# Patient Record
Sex: MALE | Race: AMERICAN INDIAN OR ALASKA NATIVE
[De-identification: names, ages, dates, MRNs, and addresses within clinical notes are randomized per-mention and may not be internally consistent; named-entity substitution may affect disease eponyms.]

---
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
## 2019-08-06 NOTE — EVENT NOTE
ED Screening Note


Date of service: 08/06/19


Time: 14:27


ED Screening Note: 


38 y/o male come in for 3 days history of tooth pain.  He is aware that he has a

bad tooth.  Has been taking pain. PMH no Meds none. No dentist.  





This initial assessment/diagnostic orders/clinical plan/treatment(s) is/are 

subject to change based on patients health status, clinical progression and re-

assessment by fellow clinical providers in the ED. Further treatment and workup 

at subsequent clinical providers discretion. Patient/guardian urged not to elope

from the ED as their condition may be serious if not clinically assessed and 

managed. 





Initial orders include:

## 2019-08-06 NOTE — EMERGENCY DEPARTMENT REPORT
ED ENT HPI





- General


Chief complaint: Dental/Oral


Stated complaint: RT SIDE TOOTHACHE


Time Seen by Provider: 08/06/19 14:26


Source: patient


Mode of arrival: Ambulatory


Limitations: No Limitations





- History of Present Illness


Initial comments: 





38 y/o male come in for 3 days history of tooth pain.  He is aware that he has a

bad tooth.  Has been taking pain. PMH no Meds none. No dentist.  





MD complaint: tooth pain


Onset/Timing: 3


-: days(s)


Location: lower lip


Quality: aching, sharp


Consistency: constant


Improves with: none


Context- Dental: history of dental caries, poor dental care


Associated Symptoms: toothache





- Related Data


                                  Previous Rx's











 Medication  Instructions  Recorded  Last Taken  Type


 


Ziprasidone [Geodon] 40 mg PO BID #60 capsule 03/29/15 Unknown Rx


 


Amoxicillin [Amoxicillin TAB] 875 mg PO BID #20 tablet 08/06/19 Unknown Rx


 


Ibuprofen [Motrin 600 MG tab] 600 mg PO Q8H PRN #30 tablet 08/06/19 Unknown Rx











                                    Allergies











Allergy/AdvReac Type Severity Reaction Status Date / Time


 


No Known Allergies Allergy   Verified 03/27/15 13:43














ED Dental HPI





- General


Chief complaint: Dental/Oral


Stated complaint: RT SIDE TOOTHACHE


Time Seen by Provider: 08/06/19 14:26


Source: patient


Mode of arrival: Ambulatory


Limitations: No Limitations





- Related Data


                                  Previous Rx's











 Medication  Instructions  Recorded  Last Taken  Type


 


Ziprasidone [Geodon] 40 mg PO BID #60 capsule 03/29/15 Unknown Rx


 


Amoxicillin [Amoxicillin TAB] 875 mg PO BID #20 tablet 08/06/19 Unknown Rx


 


Ibuprofen [Motrin 600 MG tab] 600 mg PO Q8H PRN #30 tablet 08/06/19 Unknown Rx











                                    Allergies











Allergy/AdvReac Type Severity Reaction Status Date / Time


 


No Known Allergies Allergy   Verified 03/27/15 13:43














ED Review of Systems


ROS: 


Stated complaint: RT SIDE TOOTHACHE


Other details as noted in HPI





Comment: All other systems reviewed and negative





ED Past Medical Hx





- Past Medical History


Previous Medical History?: Yes


Hx Hypertension: Yes


Hx Diabetes: Yes


Hx Psychiatric Treatment: No


Additional medical history: schizophrenia off meds





- Surgical History


Past Surgical History?: Yes


Additional Surgical History: HERNIA REPAIR





- Social History


Smoking Status: Current Every Day Smoker


Substance Use Type: Alcohol, Marijuana





- Medications


Home Medications: 


                                Home Medications











 Medication  Instructions  Recorded  Confirmed  Last Taken  Type


 


Ziprasidone [Geodon] 40 mg PO BID #60 capsule 03/29/15  Unknown Rx


 


Amoxicillin [Amoxicillin TAB] 875 mg PO BID #20 tablet 08/06/19  Unknown Rx


 


Ibuprofen [Motrin 600 MG tab] 600 mg PO Q8H PRN #30 tablet 08/06/19  Unknown Rx














ED Physical Exam





- General


Limitations: No Limitations


General appearance: alert, in no apparent distress





- Head


Head exam: Present: atraumatic, normocephalic





- Eye


Eye exam: Present: normal appearance





- Expanded ENT Exam


  ** Expanded


Teeth exam: Present: dental caries, gingival enlargement





- Respiratory


Respiratory exam: Absent: rhonchi





- Neurological Exam


Neurological exam: Present: alert, oriented X3





- Psychiatric


Psychiatric exam: Present: normal affect, normal mood





- Skin


Skin exam: Present: warm, dry, intact, normal color.  Absent: rash





ED Course





                                   Vital Signs











  08/06/19





  14:26


 


Temperature 97.7 F


 


Pulse Rate 56 L


 


Blood Pressure 133/100


 


O2 Sat by Pulse 98





Oximetry 














ED Medical Decision Making





- Medical Decision Making





38 y/o male come in for 3 days history of tooth pain.  He is aware that he has a

bad tooth.  Has been taking pain. PMH no Meds none. No dentist.  Rx for 

amoxicillin and ibuprofen 





Critical care attestation.: 


If time is entered above; I have spent that time in minutes in the direct care 

of this critically ill patient, excluding procedure time.








ED Disposition


Clinical Impression: 


 Pain, dental





Disposition: DC-01 TO HOME OR SELFCARE


Is pt being admited?: No


Does the pt Need Aspirin: No


Condition: Stable


Instructions:  Toothache (ED)


Additional Instructions: 


take medication as prescribed.  


Prescriptions: 


Amoxicillin [Amoxicillin TAB] 875 mg PO BID #20 tablet


Ibuprofen [Motrin 600 MG tab] 600 mg PO Q8H PRN #30 tablet


 PRN Reason: Pain


Referrals: 


[facility] [Outside] - 3-5 Days


[facility] [Outside] - 3-5 Days

## 2019-12-24 NOTE — XRAY REPORT
CHEST 2 VIEWS 



INDICATION / CLINICAL INFORMATION:

fever flank pain.



COMPARISON: 

None available.



FINDINGS:



SUPPORT DEVICES: None.

HEART / MEDIASTINUM: No significant abnormality. 

LUNGS / PLEURA: No significant pulmonary or pleural abnormality. No pneumothorax. 



ADDITIONAL FINDINGS: No significant additional findings.



IMPRESSION:

1. No acute findings.



Signer Name: Paxton Saini MD 

Signed: 12/24/2019 5:46 PM

 Workstation Name: [id number]

## 2019-12-24 NOTE — EMERGENCY DEPARTMENT REPORT
Blank Doc





- Documentation


Documentation: 





39-year-old male that presents with left flank pain.\





This initial assessment/diagnostic orders/clinical plan/treatment(s) is/are 

subject to change based on patient's health status, clinical progression and re-

assessment by fellow clinical providers in the ED.  Further treatment and workup

at subsequent clinical providers discretion.  Patient/guardians urged not to 

elope from the ED as their condition may be serious if not clinically assessed 

and managed.  Initial orders include:


1- Patient sent to [facility] for further evaluation and treatment


2- labs


3- UA


4- motrin-RN to repeat vitals
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
## 2020-09-29 ENCOUNTER — HOSPITAL ENCOUNTER (EMERGENCY)
Dept: HOSPITAL 5 - ED | Age: 40
Discharge: HOME | End: 2020-09-29
Payer: MEDICAID

## 2020-09-29 VITALS — SYSTOLIC BLOOD PRESSURE: 121 MMHG | DIASTOLIC BLOOD PRESSURE: 60 MMHG

## 2020-09-29 DIAGNOSIS — E11.9: ICD-10-CM
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
## 2020-09-29 DIAGNOSIS — S39.012A: Primary | ICD-10-CM

## 2020-09-29 DIAGNOSIS — F17.200: ICD-10-CM

## 2020-09-29 DIAGNOSIS — Z79.2: ICD-10-CM

## 2020-09-29 DIAGNOSIS — F20.9: ICD-10-CM

## 2020-09-29 DIAGNOSIS — Z79.1: ICD-10-CM

## 2020-09-29 DIAGNOSIS — I10: ICD-10-CM
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
## 2020-09-29 DIAGNOSIS — Z79.899: ICD-10-CM
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
## 2020-09-29 DIAGNOSIS — F14.10: ICD-10-CM

## 2020-09-29 PROCEDURE — 99282 EMERGENCY DEPT VISIT SF MDM: CPT

## 2020-09-29 NOTE — EMERGENCY DEPARTMENT REPORT
ED Back Pain/Injury HPI





- General


Chief Complaint: Back Pain/Injury


Stated Complaint: BACK PAIN


Time Seen by Provider: 09/29/20 21:26


Source: patient


Limitations: No Limitations





- History of Present Illness


Initial Comments: 


 Pt is a 41 y/o aam with hx of low back pain , DMII, and HTN who presents for 

low back strain after lifting furniture 2 days ago. pt denies fall or trauma, pt

states he felt pull in his low back while lifting. pain is described as 4/10 

aching burning. pain radiates to left buttocks, pt denies numbess, no weakness ,

no paralysis. pain is exacerbated by bending , twisting and bending. pain is 

relieved by rest. pt denies dysuria, frequency, or urgency. There is no 

hematuria, remains ambulatory to baseline with steady gait per patient. pt 

denies loss or decrease in bowel or bladder function. 





MD Complaint: back injury


Similar Symptoms Previously: Yes


Place: home





- Related Data


                                  Previous Rx's











 Medication  Instructions  Recorded  Last Taken  Type


 


Ziprasidone [Geodon] 40 mg PO BID #60 capsule 03/29/15 Unknown Rx


 


Amoxicillin [Amoxicillin TAB] 875 mg PO BID #20 tablet 08/06/19 Unknown Rx


 


Ibuprofen [Motrin 600 MG tab] 600 mg PO Q8H PRN #30 tablet 08/06/19 Unknown Rx


 


Ciprofloxacin HCl [Ciprofloxacin 500 mg PO Q12HR #20 tab 12/24/19 Unknown Rx





TAB]    


 


HYDROcodone/APAP 5-325 [Leckrone 1 - 2 each PO Q6HR PRN #10 tablet 12/24/19 Unknown

 Rx





5/325]    


 


Ibuprofen [Motrin 800 MG tab] 800 mg PO Q8HR PRN #20 tablet 12/24/19 Unknown Rx


 


Cyclobenzaprine [Flexeril] 10 mg PO BID PRN #20 tablet 09/29/20 Unknown Rx


 


Menthol/Camphor [Tiger Balm 1 applicatio TP TID PRN #1 tube 09/29/20 Unknown Rx





Ointment]    


 


Naproxen 500 mg PO BID PRN #30 tablet 09/29/20 Unknown Rx











                                    Allergies











Allergy/AdvReac Type Severity Reaction Status Date / Time


 


No Known Allergies Allergy   Verified 03/27/15 13:43














ED Review of Systems


ROS: 


Stated complaint: BACK PAIN


Other details as noted in HPI





Constitutional: denies: chills, fever


Eyes: denies: eye pain, eye discharge, vision change


ENT: denies: ear pain, throat pain


Respiratory: denies: cough, shortness of breath, wheezing


Cardiovascular: denies: chest pain, palpitations


Endocrine: no symptoms reported


Gastrointestinal: denies: abdominal pain, nausea, diarrhea


Genitourinary: denies: urgency, dysuria


Musculoskeletal: back pain, arthralgia


Skin: denies: rash, lesions


Neurological: denies: headache, weakness, paresthesias, vertigo


Psychiatric: denies: anxiety, depression


Hematological/Lymphatic: denies: easy bleeding, easy bruising





ED Past Medical Hx





- Past Medical History


Hx Hypertension: Yes


Hx Diabetes: Yes


Hx Psychiatric Treatment: No


Additional medical history: schizophrenia off meds





- Surgical History


Additional Surgical History: HERNIA REPAIR





- Social History


Smoking Status: Current Every Day Smoker


Substance Use Type: Cocaine





- Medications


Home Medications: 


                                Home Medications











 Medication  Instructions  Recorded  Confirmed  Last Taken  Type


 


Ziprasidone [Geodon] 40 mg PO BID #60 capsule 03/29/15  Unknown Rx


 


Amoxicillin [Amoxicillin TAB] 875 mg PO BID #20 tablet 08/06/19  Unknown Rx


 


Ibuprofen [Motrin 600 MG tab] 600 mg PO Q8H PRN #30 tablet 08/06/19  Unknown Rx


 


Ciprofloxacin HCl [Ciprofloxacin 500 mg PO Q12HR #20 tab 12/24/19  Unknown Rx





TAB]     


 


HYDROcodone/APAP 5-325 [Leckrone 1 - 2 each PO Q6HR PRN #10 tablet 12/24/19  

Unknown Rx





5/325]     


 


Ibuprofen [Motrin 800 MG tab] 800 mg PO Q8HR PRN #20 tablet 12/24/19  Unknown Rx


 


Cyclobenzaprine [Flexeril] 10 mg PO BID PRN #20 tablet 09/29/20  Unknown Rx


 


Menthol/Camphor [Tiger Balm 1 applicatio TP TID PRN #1 tube 09/29/20  Unknown Rx





Ointment]     


 


Naproxen 500 mg PO BID PRN #30 tablet 09/29/20  Unknown Rx














ED Physical Exam





- General


Limitations: No Limitations


General appearance: alert, in no apparent distress





- Head


Head exam: Present: atraumatic, normocephalic





- Eye


Eye exam: Present: normal appearance





- ENT


ENT exam: Present: mucous membranes moist





- Neck


Neck exam: Present: normal inspection, full ROM.  Absent: tenderness





- Respiratory


Respiratory exam: Present: normal lung sounds bilaterally.  Absent: respiratory 

distress, wheezes, stridor





- Cardiovascular


Cardiovascular Exam: Present: regular rate, normal rhythm, normal heart sounds. 

Absent: systolic murmur, diastolic murmur, rubs, gallop





- GI/Abdominal


GI/Abdominal exam: Present: soft, normal bowel sounds.  Absent: distended, 

tenderness





- Rectal


Rectal exam: Present: deferred





- Extremities Exam


Extremities exam: Present: normal inspection, full ROM.  Absent: tenderness





- Back Exam


Back exam: Present: normal inspection, muscle spasm, vertebral tenderness.  

Absent: CVA tenderness (R), CVA tenderness (L)





- Expanded Back Exam


  ** Expanded


Back exam: Absent: saddle anesthesia


Back exam: Positive Straight Leg Raise: Left, Negative Straight Leg Raising: 

Right





- Neurological Exam


Neurological exam: Present: alert, oriented X3, CN II-XII intact, normal gait, 

reflexes normal.  Absent: motor sensory deficit





- Expanded Neurological Exam


  ** Expanded


Patient oriented to: Present: person, place, time


Speech: Present: fluid speech


Motor strength exam: RLE: 5, LLE: 5


DTR: ankle (R): 2+, ankle (L): 2+


Best Eye Response (Shavon): (4) open spontaneously


Best Motor Response (Shavon): (6) obeys commands


Best Verbal Response (Shelton): (5) oriented


[place] Total: 15





- Psychiatric


Psychiatric exam: Present: normal affect, normal mood





- Skin


Skin exam: Present: warm, dry, intact, normal color.  Absent: rash





ED Course





                                   Vital Signs











  09/29/20





  18:51


 


Temperature 98.1 F


 


Pulse Rate 72


 


Respiratory 20





Rate 


 


Blood Pressure 121/60





[Right] 


 


O2 Sat by Pulse 98





Oximetry 














ED Medical Decision Making





- Medical Decision Making


 pain is improved with nsaids given in ed. pt is in usual location, and 

intensity, as previous low back strains.  pt denies urinary symptoms, there has 

been no trauma or fall, low back muscular pain is reproducible to deep palpation

 & Straight leg left. There are no neuro symptomns, This is likely low back 

strain, plan, dc to home , back exercises, moist heat therapy, nsaids prn, 

follow up with primary care doctor in 2-3 days. 





Critical care attestation.: 


If time is entered above; I have spent that time in minutes in the direct care 

of this critically ill patient, excluding procedure time.








ED Disposition


Clinical Impression: 


Low back strain


Qualifiers:


 Encounter type: initial encounter Qualified Code(s): S39.012A - Strain of 

muscle, fascia and tendon of lower back, initial encounter





Disposition: DC-01 TO HOME OR SELFCARE


Is pt being admited?: No


Does the pt Need Aspirin: No


Condition: Stable


Instructions:  Low Back Strain (ED), Core Strengthening Exercises (GEN)


Prescriptions: 


Cyclobenzaprine [Flexeril] 10 mg PO BID PRN #20 tablet


 PRN Reason: Muscle Spasm


Naproxen 500 mg PO BID PRN #30 tablet


 PRN Reason: Pain


Menthol/Camphor [Tiger Balm Ointment] 1 applicatio TP TID PRN #1 tube


 PRN Reason: PAIN


Referrals: 


SIDNEY FAJARDO MD [Staff Physician] - 3-5 Days


Forms:  Work/School Release Form(ED)


Time of Disposition: 22:01

## 2020-10-22 ENCOUNTER — HOSPITAL ENCOUNTER (EMERGENCY)
Dept: HOSPITAL 5 - ED | Age: 40
Discharge: LEFT BEFORE DISCHARGE | End: 2020-10-22
Payer: MEDICAID

## 2020-10-22 VITALS — DIASTOLIC BLOOD PRESSURE: 66 MMHG | SYSTOLIC BLOOD PRESSURE: 118 MMHG

## 2020-10-22 DIAGNOSIS — E11.65: Primary | ICD-10-CM

## 2020-10-22 DIAGNOSIS — Z53.21: ICD-10-CM

## 2020-10-22 DIAGNOSIS — Z00.00: ICD-10-CM

## 2020-10-22 PROCEDURE — 82962 GLUCOSE BLOOD TEST: CPT

## 2020-10-22 NOTE — EMERGENCY DEPARTMENT REPORT
Chief Complaint: Hyperglycemia


Stated Complaint: HYPERGLYCEMIA


Time Seen by Provider: 10/22/20 16:33





- HPI


History of Present Illness: 





Patient is a 40-year-old male presents emergency room with complaints of a blood

sugar check.  He states that he was previously diagnosed with diabetes and used 

to be on medication but has not taken medication since approximately 2015.  He 

denies any symptoms at all.  He denies any nausea, vomiting, diarrhea, fever, 

cough, abdominal pain, chest pain, shortness of breath, urinary symptoms.  He 

denies any other past medical history.  No allergies to medications.  He denies 

any SI or HI.








Vitals are normal





Blood glucose point of care performed by nurse and is 81








On exam:


Non toxic appearing, no acute distress


atraumatic, normocephalic


normal appearance of the eyes, EOMI, no periorbital edema or ecchymosis


moist mucus membranes


No respiratory distress, no accessory muscle use


A&O x4, no focal neuro deficit


skin is warm, dry











Patient is presenting for a blood sugar check


He denies any symptoms


His blood glucose is 81


Advised patient that this is a normal reading and where his blood sugar should 

be


Patient will be referred to primary care doctor 


Discussed strict return precautions





Medical screen examination performed and there is no threat to life or limb at 

this time








- Exam


Vital Signs: 


                                   Vital Signs











  10/22/20





  13:35


 


Temperature 98.6 F


 


Pulse Rate 63


 


Respiratory 18





Rate 


 


Blood Pressure 118/66





[Right] 


 


O2 Sat by Pulse 98





Oximetry 











MSE screening note: 


Focused history and physical exam performed.














ED Disposition for MSE


Clinical Impression: 


 Encounter for medical screening examination





Disposition: Z-07 MED SCREENING EXAM-LEFT


Is pt being admited?: No


Does the pt Need Aspirin: No


Condition: Stable


Additional Instructions: 


Increase your water intake.  Follow-up with a primary care doctor.  Return to 

emergency room for any new or worsening symptoms.


Referrals: 


JOSE LUIS HAGAN MD [Staff Physician] - 2-3 Days


[facility] [Provider Group] - 2-3 Days


[facility] [address], [LAB/CONTRACT] - 2-3 Days


Time of Disposition: 16:35


Print Language: ENGLISH

## 2021-05-03 ENCOUNTER — HOSPITAL ENCOUNTER (EMERGENCY)
Dept: HOSPITAL 5 - ED | Age: 41
Discharge: LEFT BEFORE DISCHARGE | End: 2021-05-03
Payer: MEDICAID

## 2021-05-03 VITALS — DIASTOLIC BLOOD PRESSURE: 74 MMHG | SYSTOLIC BLOOD PRESSURE: 124 MMHG

## 2021-05-03 DIAGNOSIS — R05: Primary | ICD-10-CM

## 2021-05-03 DIAGNOSIS — Z53.21: ICD-10-CM

## 2021-05-23 ENCOUNTER — HOSPITAL ENCOUNTER (EMERGENCY)
Dept: HOSPITAL 5 - ED | Age: 41
LOS: 1 days | End: 2021-05-24
Payer: MEDICAID

## 2021-05-23 DIAGNOSIS — Z53.21: ICD-10-CM

## 2021-05-23 DIAGNOSIS — Z00.8: Primary | ICD-10-CM

## 2021-07-31 ENCOUNTER — HOSPITAL ENCOUNTER (EMERGENCY)
Dept: HOSPITAL 5 - ED | Age: 41
Discharge: LEFT BEFORE DISCHARGE | End: 2021-07-31
Payer: MEDICAID

## 2021-07-31 DIAGNOSIS — M54.9: Primary | ICD-10-CM

## 2021-07-31 DIAGNOSIS — Z53.21: ICD-10-CM

## 2021-09-30 ENCOUNTER — HOSPITAL ENCOUNTER (EMERGENCY)
Dept: HOSPITAL 5 - ED | Age: 41
Discharge: HOME | End: 2021-09-30
Payer: MEDICAID

## 2021-09-30 VITALS — SYSTOLIC BLOOD PRESSURE: 113 MMHG | DIASTOLIC BLOOD PRESSURE: 57 MMHG

## 2021-09-30 DIAGNOSIS — R07.89: Primary | ICD-10-CM

## 2021-09-30 DIAGNOSIS — Z98.890: ICD-10-CM

## 2021-09-30 DIAGNOSIS — E11.8: ICD-10-CM

## 2021-09-30 DIAGNOSIS — I10: ICD-10-CM

## 2021-09-30 DIAGNOSIS — F17.290: ICD-10-CM

## 2021-09-30 LAB
BILIRUB UR QL STRIP: 2
CAOX CRY #/AREA URNS HPF: (no result) /[HPF]
MUCOUS THREADS #/AREA URNS HPF: (no result) /HPF
PH UR STRIP: 5 [PH] (ref 5–7)
RENAL EPI CELLS #/AREA URNS LPF: 0 /LPF
UROBILINOGEN UR-MCNC: 1 MG/DL (ref ?–2)
WBC #/AREA URNS HPF: 5 /HPF (ref 0–6)

## 2021-09-30 PROCEDURE — 93005 ELECTROCARDIOGRAM TRACING: CPT

## 2021-09-30 PROCEDURE — 99284 EMERGENCY DEPT VISIT MOD MDM: CPT

## 2021-09-30 PROCEDURE — 81001 URINALYSIS AUTO W/SCOPE: CPT

## 2021-09-30 PROCEDURE — 71046 X-RAY EXAM CHEST 2 VIEWS: CPT

## 2021-09-30 PROCEDURE — 96372 THER/PROPH/DIAG INJ SC/IM: CPT

## 2021-09-30 NOTE — EMERGENCY DEPARTMENT REPORT
ED GI Bleed HPI





- General


Chief complaint: Abdominal Pain


Stated complaint: RT SIDE BODY PAIN


Time Seen by Provider: 09/30/21 11:48


Source: patient


Mode of arrival: Ambulatory


Limitations: No Limitations





- Related Data


                                  Previous Rx's











 Medication  Instructions  Recorded  Last Taken  Type


 


Ziprasidone [Geodon] 40 mg PO BID #60 capsule 03/29/15 Unknown Rx


 


Amoxicillin [Amoxicillin TAB] 875 mg PO BID #20 tablet 08/06/19 Unknown Rx


 


Ibuprofen [Motrin 600 MG tab] 600 mg PO Q8H PRN #30 tablet 08/06/19 Unknown Rx


 


Ciprofloxacin HCl [Ciprofloxacin 500 mg PO Q12HR #20 tab 12/24/19 Unknown Rx





TAB]    


 


HYDROcodone/APAP 5-325 [[place] 1 - 2 each PO Q6HR PRN #10 tablet 12/24/19 Unknown

 Rx





5/325]    


 


Ibuprofen [Motrin 800 MG tab] 800 mg PO Q8HR PRN #20 tablet 12/24/19 Unknown Rx


 


Cyclobenzaprine [Flexeril] 10 mg PO BID PRN #20 tablet 09/29/20 Unknown Rx


 


Menthol/Camphor [Tiger Balm 1 applicatio TP TID PRN #1 tube 09/29/20 Unknown Rx





Ointment]    


 


Naproxen 500 mg PO BID PRN #30 tablet 09/29/20 Unknown Rx











                                    Allergies











Allergy/AdvReac Type Severity Reaction Status Date / Time


 


No Known Allergies Allergy   Verified 03/27/15 13:43














ED Review of Systems


ROS: 


Stated complaint: RT SIDE BODY PAIN


Other details as noted in HPI








ED Past Medical Hx





- Past Medical History


Hx Hypertension: Yes


Hx Diabetes: Yes


Hx Psychiatric Treatment: No


Additional medical history: schizophrenia off meds





- Surgical History


Additional Surgical History: HERNIA REPAIR





- Social History


Smoking Status: Current Every Day Smoker


Substance Use Type: Alcohol, Cocaine, Marijuana





- Medications


Home Medications: 


                                Home Medications











 Medication  Instructions  Recorded  Confirmed  Last Taken  Type


 


Ziprasidone [Geodon] 40 mg PO BID #60 capsule 03/29/15  Unknown Rx


 


Amoxicillin [Amoxicillin TAB] 875 mg PO BID #20 tablet 08/06/19  Unknown Rx


 


Ibuprofen [Motrin 600 MG tab] 600 mg PO Q8H PRN #30 tablet 08/06/19  Unknown Rx


 


Ciprofloxacin HCl [Ciprofloxacin 500 mg PO Q12HR #20 tab 12/24/19  Unknown Rx





TAB]     


 


HYDROcodone/APAP 5-325 [[place] 1 - 2 each PO Q6HR PRN #10 tablet 12/24/19  

Unknown Rx





5/325]     


 


Ibuprofen [Motrin 800 MG tab] 800 mg PO Q8HR PRN #20 tablet 12/24/19  Unknown Rx


 


Cyclobenzaprine [Flexeril] 10 mg PO BID PRN #20 tablet 09/29/20  Unknown Rx


 


Menthol/Camphor [Tiger Balm 1 applicatio TP TID PRN #1 tube 09/29/20  Unknown Rx





Ointment]     


 


Naproxen 500 mg PO BID PRN #30 tablet 09/29/20  Unknown Rx














ED Physical Exam





- General


Limitations: No Limitations





ED Course





                                   Vital Signs











  09/30/21





  11:42


 


Temperature 98.3 F


 


Pulse Rate 69


 


Respiratory 20





Rate 


 


Blood Pressure 113/57


 


O2 Sat by Pulse 98





Oximetry 











Critical care attestation.: 


If time is entered above; I have spent that time in minutes in the direct care 

of this critically ill patient, excluding procedure time.








ED Disposition


Condition: Stable

## 2021-09-30 NOTE — EMERGENCY DEPARTMENT REPORT
ED General Adult HPI





- General


Chief complaint: Abdominal Pain


Stated complaint: Right-sided lateral thoracic pain


PUI?: No


Time Seen by Provider: 09/30/21 11:48


Source: patient, RN notes reviewed, old records reviewed


Mode of arrival: Ambulatory


Limitations: No Limitations





- History of Present Illness


Initial comments: 





The patient was evaluated in the emergency department for symptoms described in 

the history of present illness.  He/she was evaluated in the context of the 

global COVID-19 pandemic, which necessitated consideration that the patient 

might be at risk for infection with the virus that causes COVID-19.  

Institutional protocols and algorithms that pertain to the evaluation of 

patients at risk for COVID-19 are in a state of rapid change based on 

information released by regulatory bodies including the CDC and federal and 

state organizations.  These policies and algorithms were followed during the 

patient's care in the emergency department.  Please note that these policies, 

procedures and recommendations changed on a rapid basis.








The patient is a 41-year-old gentleman.  He is right-hand dominant.  He presents

to the ER today with complaints of reproducible right-sided lateral thoracic 

muscular pain.  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 

heavy lifting.  The patient denies headache, neck pain, chest pain, abdominal 

pain, shortness of breath, urinary symptoms, DVT, travel, surgery, 

immobilization.





The patient symptoms were improved with Tylenol and with Toradol.





At the moment, the patient is sleeping comfortably on his chair, and is 

endorsing readiness for discharge.


-: Gradual, hour(s)


Location: right (Lateral thoracic)


Radiation: non-radiation


Quality: aching


Consistency: intermittent


Improves with: medication, rest


Worsens with: movement


Associated Symptoms: denies other symptoms





- Related Data


                                  Previous Rx's











 Medication  Instructions  Recorded  Last Taken  Type


 


Ziprasidone [Geodon] 40 mg PO BID #60 capsule 03/29/15 Unknown Rx


 


Amoxicillin [Amoxicillin TAB] 875 mg PO BID #20 tablet 08/06/19 Unknown Rx


 


Ibuprofen [Motrin 600 MG tab] 600 mg PO Q8H PRN #30 tablet 08/06/19 Unknown Rx


 


Ciprofloxacin HCl [Ciprofloxacin 500 mg PO Q12HR #20 tab 12/24/19 Unknown Rx





TAB]    


 


HYDROcodone/APAP 5-325 [[place] 1 - 2 each PO Q6HR PRN #10 tablet 12/24/19 Unknown

 Rx





5/325]    


 


Ibuprofen [Motrin 800 MG tab] 800 mg PO Q8HR PRN #20 tablet 12/24/19 Unknown Rx


 


Cyclobenzaprine [Flexeril] 10 mg PO BID PRN #20 tablet 09/29/20 Unknown Rx


 


Menthol/Camphor [Tiger Balm 1 applicatio TP TID PRN #1 tube 09/29/20 Unknown Rx





Ointment]    


 


Naproxen 500 mg PO BID PRN #30 tablet 09/29/20 Unknown Rx











                                    Allergies











Allergy/AdvReac Type Severity Reaction Status Date / Time


 


No Known Allergies Allergy   Verified 03/27/15 13:43














ED Review of Systems


ROS: 


Stated complaint: RT SIDE BODY PAIN


Other details as noted in HPI





Comment: All other systems reviewed and negative


Musculoskeletal: arthralgia, myalgia





ED Past Medical Hx





- Past Medical History


Hx Hypertension: Yes


Hx Diabetes: Yes


Hx Psychiatric Treatment: No


Additional medical history: schizophrenia off meds





- Surgical History


Additional Surgical History: HERNIA REPAIR





- Social History


Smoking Status: Current Every Day Smoker


Substance Use Type: Alcohol, Cocaine, Marijuana





- Medications


Home Medications: 


                                Home Medications











 Medication  Instructions  Recorded  Confirmed  Last Taken  Type


 


Ziprasidone [Geodon] 40 mg PO BID #60 capsule 03/29/15  Unknown Rx


 


Amoxicillin [Amoxicillin TAB] 875 mg PO BID #20 tablet 08/06/19  Unknown Rx


 


Ibuprofen [Motrin 600 MG tab] 600 mg PO Q8H PRN #30 tablet 08/06/19  Unknown Rx


 


Ciprofloxacin HCl [Ciprofloxacin 500 mg PO Q12HR #20 tab 12/24/19  Unknown Rx





TAB]     


 


HYDROcodone/APAP 5-325 [[place] 1 - 2 each PO Q6HR PRN #10 tablet 12/24/19  

Unknown Rx





5/325]     


 


Ibuprofen [Motrin 800 MG tab] 800 mg PO Q8HR PRN #20 tablet 12/24/19  Unknown Rx


 


Cyclobenzaprine [Flexeril] 10 mg PO BID PRN #20 tablet 09/29/20  Unknown Rx


 


Menthol/Camphor [Tiger Balm 1 applicatio TP TID PRN #1 tube 09/29/20  Unknown Rx





Ointment]     


 


Naproxen 500 mg PO BID PRN #30 tablet 09/29/20  Unknown Rx














ED Physical Exam





- General


Limitations: No Limitations


General appearance: alert, in no apparent distress





- Head


Head exam: Present: atraumatic, normocephalic





- Eye


Eye exam: Present: normal appearance, EOMI.  Absent: nystagmus





- ENT


ENT exam: Present: normal exam, normal orophraynx, mucous membranes moist, 

normal external ear exam





- Neck


Neck exam: Present: normal inspection, full ROM.  Absent: tenderness, 

meningismus





- Respiratory


Respiratory exam: Present: normal lung sounds bilaterally, chest wall 

tenderness.  Absent: respiratory distress, wheezes, rales, rhonchi, stridor, 

decreased breath sounds





- Cardiovascular


Cardiovascular Exam: Present: regular rate, normal rhythm, normal heart sounds. 

Absent: bradycardia, tachycardia, irregular rhythm, systolic murmur, diastolic 

murmur, rubs, gallop





- GI/Abdominal


GI/Abdominal exam: Present: soft.  Absent: distended, tenderness, guarding, 

rebound, rigid, pulsatile mass





- Rectal


Rectal exam: Present: deferred





- Extremities Exam


Extremities exam: Present: normal inspection, full ROM, other (2+ pulses noted 

in the bilateral upper and lower extremities.  There is no palpable cord.   

negative Homans sign.  Muscular compartments are soft.  The pelvis is stable.). 

Absent: pedal edema, calf tenderness





- Back Exam


Back exam: Present: normal inspection, paraspinal tenderness.  Absent: 

tenderness, CVA tenderness (R), CVA tenderness (L), vertebral tenderness





- Neurological Exam


Neurological exam: Present: alert, oriented X3, normal gait, other (No facial 

droop.  Tongue midline.  Extraocular movements intact bilaterally.  Facial 

sensation intact to light touch in V1, V2, V3 distribution bilaterally.  5 and a

5 strength in 4 extremities.  Sensation intact to light touch in 4 

extremities.).  Absent: motor sensory deficit





- Psychiatric


Psychiatric exam: Present: normal affect, normal mood





- Skin


Skin exam: Present: warm, dry, intact, normal color.  Absent: rash





ED Course


                                   Vital Signs











  09/30/21





  11:42


 


Temperature 98.3 F


 


Pulse Rate 69


 


Respiratory 20





Rate 


 


Blood Pressure 113/57


 


O2 Sat by Pulse 98





Oximetry 














ED Medical Decision Making





- Lab Data








                                   Vital Signs











  09/30/21





  11:42


 


Temperature 98.3 F


 


Pulse Rate 69


 


Respiratory 20





Rate 


 


Blood Pressure 113/57


 


O2 Sat by Pulse 98





Oximetry 











                                   Lab Results











  09/30/21 Range/Units





  Unknown 


 


Urine Color  Yellow  (Yellow)  


 


Urine Turbidity  Clear  (Clear)  


 


Urine pH  5.0  (5.0-7.0)  


 


Ur Specific Gravity  1.035 H  (1.003-1.030)  


 


Urine Protein  30 mg/dl  (Negative)  mg/dL


 


Urine Glucose (UA)  Negative  (Negative)  mg/dL


 


Urine Ketones  Negative  (Negative)  mg/dL


 


Urine Blood  Negative  (Negative)  


 


Urine Nitrite  Negative  (Negative)  


 


Urine Bilirubin  2  (Negative)  


 


Urine Ictotest  Not Reportable  


 


Urine Urobilinogen  1.0  (<2.0)  mg/dL


 


Ur Leukocyte Esterase  1  (Negative)  


 


Urine WBC (Auto)  5.0  (0.0-6.0)  /HPF


 


Ur Renal Epithelial Cell  0  /LPF


 


Calcium Oxalate Crystal  4+  


 


Urine Mucus  2+  /HPF














- EKG Data


-: EKG Interpreted by Me


EKG shows normal: sinus rhythm


Rate: normal





- EKG Data





09/30/21 13:10


EKG is interpreted at 12: 03





Sinus rhythm, rate 63 bpm.  Normal axis, normal P wave axis.  High left 

ventricular voltage.  Abnormal EKG.  Not a STEMI.  There is no prior for 

comparison.





- Radiology Data


Radiology results: pending, report reviewed, image reviewed





CHEST 2 VIEWS  INDICATION / CLINICAL INFORMATION: right lateral thoacic pain.  

COMPARISON: December 2019  FINDINGS:  SUPPORT DEVICES: None. HEART / 

MEDIASTINUM: No significant abnormality. LUNGS / PLEURA: No significant 

pulmonary or pleural abnormality. No pneumothorax.  ADDITIONAL FINDINGS: No 

significant additional findings.  IMPRESSION: 1. No acute findings.  Signer 

Name: Shon Sadler MD Signed: 9/30/2021 11:41 AM Workstation Name: [email or web address]





- Medical Decision Making





Differential diagnosis, including but not limited to: Costochondritis, 

pneumonia, pneumonitis, GERD, gastritis, hiatal hernia








Assessment and plan: 41-year-old gentleman, who is afebrile, with reassuring 

vital signs, who is clinically sober, with reproducible lateral chest wall pain,

without evidence of redness, pus, streaking or vesicles, who is not currently 

tachycardic, tachypneic or hypoxic, who denies DVT and pulmonary embolism risk 

factors, who is low risk by Wells criteria by PERC score, with an unremarkable 

EKG, and an unremarkable urinalysis, who is clinically improved as a supportive 

care.





Rest, ice, compression elevation therapy, Tylenol and Motrin as needed for pain,

alternate ice packs and heat packs, follow-up with outpatient primary care.


Critical care attestation.: 


If time is entered above; I have spent that time in minutes in the direct care 

of this critically ill patient, excluding procedure time.








ED Disposition


Clinical Impression: 


 Chest wall pain





Disposition: 01 HOME / SELF CARE / HOMELESS


Is pt being admited?: No


Does the pt Need Aspirin: No


Condition: Good


Instructions:  Costochondritis


Additional Instructions: 


Patient may alternate ice packs and heat packs as needed for physical pain.





Please take Motrin over-the-counter, 400 mg with food, every 6 hours as needed 

for pain, alternating with Tylenol/acetaminophen, 650 mg by mouth, every 6 

hours, as needed for pain.





Please follow-up with your primary care doctor within the next 2 weeks for 

repeat checkup and evaluation.





Please return to the emergency room right away with new pain, worsened pain, 

migration of pain, projectile vomiting, change in mental status, confusion, 

inability to tolerate liquid feeds, new, worsened or different symptoms not 

present on the initial emergency room evaluation.


Referrals: 


[facility] [Provider Group] - 3-5 Days


[facility] [Outside] - 3-5 Days


Forms:  Work/School Release Form(ED)

## 2021-09-30 NOTE — XRAY REPORT
CHEST 2 VIEWS 



INDICATION / CLINICAL INFORMATION: right lateral thoacic pain.



COMPARISON: December 2019



FINDINGS:



SUPPORT DEVICES: None.

HEART / MEDIASTINUM: No significant abnormality. 

LUNGS / PLEURA: No significant pulmonary or pleural abnormality. No pneumothorax. 



ADDITIONAL FINDINGS: No significant additional findings.



IMPRESSION:

1. No acute findings.



Signer Name: Shon Sadler MD 

Signed: 9/30/2021 12:41 PM

Workstation Name: [id number]

## 2021-10-04 NOTE — ELECTROCARDIOGRAPH REPORT
[facility]

                                       

Test Date:    2021               Test Time:    12:03:26

Pat Name:     KERRY Tamms          Department:   

Patient ID:   [id number]          Room:          

Gender:       M                        Technician:   adali

:          1980               Requested By: SABIHA WALLACE

Order Number: [id number]              Reading MD:   Micha Leong

                                 Measurements

Intervals                              Axis          

Rate:         63                       P:            51

AK:           127                      QRS:          64

QRSD:         73                       T:            57

QT:           412                                    

QTc:          422                                    

                           Interpretive Statements

Sinus rhythm

No previous ECG available for comparison

Electronically Signed On 10-4-2021 14:34:35 EDT by Micha Leong